# Patient Record
Sex: MALE | Race: WHITE | NOT HISPANIC OR LATINO | Employment: FULL TIME | URBAN - METROPOLITAN AREA
[De-identification: names, ages, dates, MRNs, and addresses within clinical notes are randomized per-mention and may not be internally consistent; named-entity substitution may affect disease eponyms.]

---

## 2017-11-10 ENCOUNTER — TELEPHONE (OUTPATIENT)
Dept: FAMILY MEDICINE CLINIC | Facility: CLINIC | Age: 22
End: 2017-11-10

## 2017-11-14 ENCOUNTER — TELEPHONE (OUTPATIENT)
Dept: FAMILY MEDICINE CLINIC | Facility: CLINIC | Age: 22
End: 2017-11-14

## 2017-11-14 NOTE — TELEPHONE ENCOUNTER
per telephone request, new prescriptions for test strips and pin needles were sent on November 10, 2017.

## 2017-11-29 ENCOUNTER — TELEPHONE (OUTPATIENT)
Dept: FAMILY MEDICINE CLINIC | Facility: CLINIC | Age: 22
End: 2017-11-29

## 2017-11-29 NOTE — TELEPHONE ENCOUNTER
First attemp to call pharmacy with no answer.  ----- Message from Sandra Trejo MA sent at 11/28/2017  1:57 PM EST -----  Regarding: FW: clarification on rx      ----- Message -----     From: Frances Isaac     Sent: 11/28/2017  10:33 AM       To: Sandra Trejo MA  Subject: clarification on rx                              [facility]--[phone] is calling regarding insulin detemir (LEVEMIR FLEXPEN) 100 UNIT/ML injection 40 units once a day --needs clarification

## 2017-12-04 ENCOUNTER — TELEPHONE (OUTPATIENT)
Dept: FAMILY MEDICINE CLINIC | Facility: CLINIC | Age: 22
End: 2017-12-04

## 2017-12-16 ENCOUNTER — OFFICE VISIT (OUTPATIENT)
Dept: FAMILY MEDICINE CLINIC | Facility: CLINIC | Age: 22
End: 2017-12-16

## 2017-12-16 VITALS
TEMPERATURE: 99 F | WEIGHT: 198 LBS | HEART RATE: 76 BPM | DIASTOLIC BLOOD PRESSURE: 90 MMHG | RESPIRATION RATE: 16 BRPM | BODY MASS INDEX: 26.12 KG/M2 | SYSTOLIC BLOOD PRESSURE: 130 MMHG

## 2017-12-16 DIAGNOSIS — L73.9 FOLLICULITIS: Primary | ICD-10-CM

## 2017-12-16 PROCEDURE — 99214 OFFICE O/P EST MOD 30 MIN: CPT | Performed by: FAMILY MEDICINE

## 2017-12-16 RX ORDER — CLINDAMYCIN HYDROCHLORIDE 300 MG/1
300 CAPSULE ORAL 3 TIMES DAILY
Qty: 30 CAPSULE | Refills: 0 | Status: SHIPPED | OUTPATIENT
Start: 2017-12-16 | End: 2019-05-03

## 2017-12-16 NOTE — PROGRESS NOTES
"Melina Lofton is a 22 y.o. male.     History of Present Illness   Saturday walk in clinic.  He is concerned with an excoriated rash to his chest wall over the past several months.  Over the past few weeks he has identified small yellow bumps.  He denies fever, chills or diffuse rashes.  He describes a chronic lifestyle of routine IV heroine and polysubstance abuse.  He reports that his injection sites will often \"get infected.\"  He denies upper extremity streaking, edema or erythema.  He denies palm or sole lesions.  He reports that he normally lives in [place] and \"just came up to [place] yesterday.\"  He describes adequate blood sugar control.    Review of Systems   Constitutional: Negative for chills and fever.   HENT: Negative for nosebleeds.    Respiratory: Negative for shortness of breath.    Cardiovascular: Negative for chest pain and leg swelling.   Gastrointestinal: Negative for diarrhea, nausea and vomiting.   Endocrine: Negative for polydipsia, polyphagia and polyuria.   Skin: Positive for rash.   Neurological: Negative for seizures and syncope.   Hematological: Negative for adenopathy. Does not bruise/bleed easily.       Objective    Sandra ARGUETA CMA in room assisting  Physical Exam   Constitutional: He is oriented to person, place, and time. He appears well-developed and well-nourished.   HENT:   Head: Normocephalic and atraumatic.   Right Ear: Hearing normal.   Left Ear: Hearing normal.   Mouth/Throat: Mucous membranes are normal.   Eyes: Conjunctivae and EOM are normal. Pupils are equal, round, and reactive to light.   Neck: Neck supple. No JVD present. No thyromegaly present.   Cardiovascular: Normal rate, regular rhythm and normal heart sounds.    Pulmonary/Chest: Effort normal and breath sounds normal.   Musculoskeletal: Normal range of motion.   Neurological: He is alert and oriented to person, place, and time. He has normal strength. No sensory deficit. Gait normal.   Skin: Skin is warm " and dry.   Chest wall folliculitis/impetigo see image   Psychiatric: He has a normal mood and affect. His behavior is normal. Judgment and thought content normal. Cognition and memory are normal.   Nursing note and vitals reviewed.             Assessment/Plan   Diagnoses and all orders for this visit:    Folliculitis  -     clindamycin (CLEOCIN) 300 MG capsule; Take 1 capsule by mouth 3 (Three) Times a Day.  - We reviewed his sulfa allergy  - Topical care reviewed  - RTC next week for follow up with his regularly treating physician  - Greater than 25 minutes with the patient with 50% of the time spent in counseling on lifestyle choices, IVDA and seeking help for addiction at the [facility].  He will go to the ED with any sudden changes or concerns.

## 2018-04-13 ENCOUNTER — OFFICE VISIT (OUTPATIENT)
Dept: FAMILY MEDICINE CLINIC | Facility: CLINIC | Age: 23
End: 2018-04-13

## 2018-04-13 VITALS
HEIGHT: 73 IN | SYSTOLIC BLOOD PRESSURE: 140 MMHG | HEART RATE: 97 BPM | OXYGEN SATURATION: 98 % | WEIGHT: 239 LBS | DIASTOLIC BLOOD PRESSURE: 78 MMHG | TEMPERATURE: 98.5 F | BODY MASS INDEX: 31.68 KG/M2

## 2018-04-13 DIAGNOSIS — E10.8 TYPE 1 DIABETES MELLITUS WITH COMPLICATION (HCC): Primary | ICD-10-CM

## 2018-04-13 DIAGNOSIS — G47.00 INSOMNIA, UNSPECIFIED TYPE: ICD-10-CM

## 2018-04-13 LAB — HBA1C MFR BLD: 8.5 %

## 2018-04-13 PROCEDURE — 99213 OFFICE O/P EST LOW 20 MIN: CPT | Performed by: FAMILY MEDICINE

## 2018-04-13 PROCEDURE — 83036 HEMOGLOBIN GLYCOSYLATED A1C: CPT | Performed by: FAMILY MEDICINE

## 2018-04-13 RX ORDER — MIRTAZAPINE 30 MG/1
30 TABLET, FILM COATED ORAL NIGHTLY
Qty: 90 TABLET | Refills: 3 | Status: SHIPPED | OUTPATIENT
Start: 2018-04-13 | End: 2019-05-03

## 2018-04-13 NOTE — PROGRESS NOTES
"Melina Lofton is a 22 y.o. male.     Diabetes   He presents for his follow-up diabetic visit. He has type 1 diabetes mellitus. There are no hypoglycemic associated symptoms. There are no diabetic associated symptoms. Pertinent negatives for diabetes include no chest pain. There are no hypoglycemic complications. Risk factors for coronary artery disease include diabetes mellitus. Current diabetic treatment includes insulin injections (Levemir and Novolog.). He is following a generally healthy diet. He participates in exercise intermittently. His home blood glucose trend is increasing steadily. An ACE inhibitor/angiotensin II receptor blocker is not being taken. He does not see a podiatrist.Eye exam is not current.        The following portions of the patient's history were reviewed and updated as appropriate: allergies, current medications, past social history and problem list.    Review of Systems   Eyes: Negative for visual disturbance.   Respiratory: Negative for shortness of breath.    Cardiovascular: Negative for chest pain.   Neurological: Negative for numbness.       Objective   /78 (BP Location: Left arm, Patient Position: Sitting, Cuff Size: Adult)   Pulse 97   Temp 98.5 °F (36.9 °C) (Oral)   Ht 185.4 cm (73\")   Wt 108 kg (239 lb)   SpO2 98%   BMI 31.53 kg/m²   Physical Exam   Constitutional: He appears well-developed and well-nourished.   Cardiovascular: Normal rate and regular rhythm.    Pulmonary/Chest: Effort normal and breath sounds normal.   Nursing note and vitals reviewed.      Assessment/Plan   Problem List Items Addressed This Visit        Endocrine    Type 1 diabetes mellitus - Primary    Relevant Orders    POC Glycosylated Hemoglobin (Hb A1C) (Completed)      Other Visit Diagnoses     Insomnia, unspecified type            Before meals now lives in [facility].  He's been there about a year and a half.  He is engaged in rehabilitation for drug use.  He says he is used " no drugs for several months now.  Does not have a primary care physician or an endocrinologist in [place].  He tells me he stopped smoking about 2 months ago and weak and graduated him on that.  He is been back in the gym now for about a month.  He needs refills on his medications and needs a portable way to obtain them.  He is had no episodes of hypoglycemia.  No difficulty with his feet nor with his vision.    Micro albumin is normal 10/50.      Drink plenty fluids.  Work on diet and weight loss.  Cut back on carbohydrates.    Continue medications as doing.    Refill Levemir flex pen 50 units daily #15+3.  Rx for NovoLog Flex pen 10 units 3 times a day #9+3.  Rx for Mirtazapine 30 mg each night #90+3.    Establish a primary care physician as well as an endocrinologist in the near future in [place].  He understands there is a good endocrinologist in [facility] and we recommended that person.  She knew with his diet and exercise program.  Work on losing weight.  His microalbumin level today was normal.  Hemoglobin A1c today is 8.5%.            Scribed for Dr Zion Perkins by Milla Mc [facility].          I, Zion Perkins MD, personally performed the services described in this documentation, as scribed by Milla Mc in my presence, and is both accurate and complete.

## 2018-09-12 DIAGNOSIS — E10.9 TYPE 1 DIABETES MELLITUS WITHOUT COMPLICATION (HCC): Primary | ICD-10-CM

## 2018-10-05 ENCOUNTER — TELEPHONE (OUTPATIENT)
Dept: FAMILY MEDICINE CLINIC | Facility: CLINIC | Age: 23
End: 2018-10-05

## 2018-10-05 NOTE — TELEPHONE ENCOUNTER
Spoke to pharmacy has been ordered----- Message from Kelli Childs sent at 10/1/2018 11:22 AM EDT -----  Regarding: ONE TOUCH LANCETS  Contact: [phone]  THE PHARMACY DOESN'T KNOW SPECIFIC TYPE AND NEEDS CLARIFICATION: ONE TOUCH DELICA INSTEAD OF LANCETS    ORDER [id number] [facility]  [phone] [facility]      PRESCRIBED ON  9/12/2018

## 2018-10-05 NOTE — TELEPHONE ENCOUNTER
----- Message from Kelli Childs sent at 10/1/2018 11:22 AM EDT -----  Regarding: ONE TOUCH LANCETS  Contact: [phone]  THE PHARMACY DOESN'T KNOW SPECIFIC TYPE AND NEEDS CLARIFICATION: ONE TOUCH DELICA INSTEAD OF LANCETS    ORDER [facility][id number] [facility]  [phone] [facility]      PRESCRIBED ON  9/12/2018

## 2018-10-05 NOTE — TELEPHONE ENCOUNTER
LEFT MESSAGE TO FIND OUT WHAT SIZE HIS LANCETS HE USES.----- Message from Kelli Childs sent at 10/1/2018 11:22 AM EDT -----  Regarding: ONE TOUCH LANCETS  Contact: [phone]  THE PHARMACY DOESN'T KNOW SPECIFIC TYPE AND NEEDS CLARIFICATION: ONE TOUCH DELICA INSTEAD OF LANCETS    ORDER #[id number] [facility]  [phone] [facility]      PRESCRIBED ON  9/12/2018

## 2019-05-03 ENCOUNTER — OFFICE VISIT (OUTPATIENT)
Dept: FAMILY MEDICINE CLINIC | Facility: CLINIC | Age: 24
End: 2019-05-03

## 2019-05-03 VITALS
SYSTOLIC BLOOD PRESSURE: 130 MMHG | OXYGEN SATURATION: 97 % | WEIGHT: 231 LBS | TEMPERATURE: 98.6 F | RESPIRATION RATE: 19 BRPM | BODY MASS INDEX: 30.48 KG/M2 | DIASTOLIC BLOOD PRESSURE: 98 MMHG | HEART RATE: 75 BPM

## 2019-05-03 DIAGNOSIS — E10.9 TYPE 1 DIABETES MELLITUS ON INSULIN THERAPY (HCC): Primary | ICD-10-CM

## 2019-05-03 DIAGNOSIS — I10 ESSENTIAL HYPERTENSION: ICD-10-CM

## 2019-05-03 LAB
ALBUMIN SERPL-MCNC: 5 G/DL (ref 3.5–5.2)
ALBUMIN/GLOB SERPL: 1.6 G/DL
ALP SERPL-CCNC: 76 U/L (ref 39–117)
ALT SERPL W P-5'-P-CCNC: 244 U/L (ref 1–41)
ANION GAP SERPL CALCULATED.3IONS-SCNC: 13.8 MMOL/L
AST SERPL-CCNC: 96 U/L (ref 1–40)
BILIRUB SERPL-MCNC: 0.8 MG/DL (ref 0.2–1.2)
BUN BLD-MCNC: 10 MG/DL (ref 6–20)
BUN/CREAT SERPL: 10.6 (ref 7–25)
CALCIUM SPEC-SCNC: 10 MG/DL (ref 8.6–10.5)
CHLORIDE SERPL-SCNC: 100 MMOL/L (ref 98–107)
CHOLEST SERPL-MCNC: 130 MG/DL (ref 0–200)
CO2 SERPL-SCNC: 25.2 MMOL/L (ref 22–29)
CREAT BLD-MCNC: 0.94 MG/DL (ref 0.76–1.27)
GFR SERPL CREATININE-BSD FRML MDRD: 98 ML/MIN/1.73
GLOBULIN UR ELPH-MCNC: 3.1 GM/DL
GLUCOSE BLD-MCNC: 127 MG/DL (ref 65–99)
HBA1C MFR BLD: 6.6 %
HDLC SERPL-MCNC: 43 MG/DL (ref 40–60)
LDLC SERPL CALC-MCNC: 77 MG/DL (ref 0–100)
LDLC/HDLC SERPL: 1.8 {RATIO}
POTASSIUM BLD-SCNC: 3.9 MMOL/L (ref 3.5–5.2)
PROT SERPL-MCNC: 8.1 G/DL (ref 6–8.5)
SODIUM BLD-SCNC: 139 MMOL/L (ref 136–145)
TRIGL SERPL-MCNC: 49 MG/DL (ref 0–150)
VLDLC SERPL-MCNC: 9.8 MG/DL (ref 5–40)

## 2019-05-03 PROCEDURE — 80061 LIPID PANEL: CPT | Performed by: FAMILY MEDICINE

## 2019-05-03 PROCEDURE — 36415 COLL VENOUS BLD VENIPUNCTURE: CPT | Performed by: FAMILY MEDICINE

## 2019-05-03 PROCEDURE — 99214 OFFICE O/P EST MOD 30 MIN: CPT | Performed by: FAMILY MEDICINE

## 2019-05-03 PROCEDURE — 80053 COMPREHEN METABOLIC PANEL: CPT | Performed by: FAMILY MEDICINE

## 2019-05-03 PROCEDURE — 83036 HEMOGLOBIN GLYCOSYLATED A1C: CPT | Performed by: FAMILY MEDICINE

## 2019-05-03 RX ORDER — LISINOPRIL 10 MG/1
10 TABLET ORAL DAILY
Qty: 90 TABLET | Refills: 1 | Status: SHIPPED | OUTPATIENT
Start: 2019-05-03 | End: 2019-11-18 | Stop reason: SDUPTHER

## 2019-05-03 NOTE — PROGRESS NOTES
Subjective   Lukas Lofton is a 24 y.o. male seen today for Diabetes.     Diabetes   He presents for his follow-up diabetic visit. He has type 1 diabetes mellitus. There are no hypoglycemic associated symptoms. (2 episodes of low glucose and use glucose tablets and improved.) There are no diabetic associated symptoms. Pertinent negatives for diabetes include no chest pain. There are no hypoglycemic complications. There are no diabetic complications. Risk factors for coronary artery disease include diabetes mellitus and male sex. Current diabetic treatment includes insulin injections (Levemir and NovoLog.). He is compliant with treatment all of the time. His weight is decreasing steadily. He is following a generally healthy diet. He participates in exercise intermittently. His home blood glucose trend is decreasing steadily.      BP today is 130/98.      The following portions of the patient's history were reviewed and updated as appropriate: allergies, current medications, past social history and problem list.    Review of Systems   Respiratory: Negative for shortness of breath.    Cardiovascular: Negative for chest pain.       Objective   /98   Pulse 75   Temp 98.6 °F (37 °C)   Resp 19   Wt 105 kg (231 lb)   SpO2 97%   BMI 30.48 kg/m²   Physical Exam   Constitutional: He appears well-developed and well-nourished.   Cardiovascular: Normal rate and regular rhythm.   Pulmonary/Chest: Effort normal and breath sounds normal.    Lukas had a diabetic foot exam performed (Normal) today.   During the foot exam he had a monofilament test performed.  Nursing note and vitals reviewed.      Assessment/Plan   Problem List Items Addressed This Visit     None      Visit Diagnoses     Type 1 diabetes mellitus on insulin therapy (CMS/[facility])    -  Primary    Relevant Orders    POC Glycosylated Hemoglobin (Hb A1C) (Completed)    Essential hypertension          EVELYNE is in today for follow-up on his diabetes at his father's  request.  The patient lives in [place] and now is gainfully employed as an .  He seems to have turned a corner.  His last admitted use of heroin was over one year ago in [place].  He is used none since.  He is feeling well.  We do note that his blood pressure is elevated today.  He is never been on an ACE inhibitor so we will go ahead and start him on lisinopril 10 mg a day.  He is also never been on a statin.  We will go ahead and check his lipid panel and metabolic panel.  We will also check a microalbumin and report the results by letter.  He tells me he is scheduled to see an endocrinologist in [place] in about 2 weeks.        Drink plenty fluids.  Continue to work on diet and weight loss.  Cut back on carbohydrates such as breads, potatoes, pastas and desserts.  Eat more  Fruits, vegetables, and lean meats such a fish and chicken.    Add Lisinopril 10 mg daily #90+1.    Continue other medications as doing.    Check a CMP and Lipids. Report results by letter.    Follow up as needed.                  Scribed for Dr Zion Perkins by Milla Mc CMA.          I, Zion Perkins MD, personally performed the services described in this documentation, as scribed by Milla Mc in my presence, and is both accurate and complete.        (Please note that portions of this note were completed with a voice recognition program. Efforts were made to edit the dictations,but occasionally words are mis transcribed.)

## 2019-06-18 DIAGNOSIS — E10.8 TYPE 1 DIABETES MELLITUS WITH COMPLICATION (HCC): ICD-10-CM

## 2019-06-18 NOTE — TELEPHONE ENCOUNTER
Insurance is requiring a 90 day supply and would like for it to be called in today.  Thank you. Please send to [facility] mail order pharmacy.

## 2019-11-18 DIAGNOSIS — E10.8 TYPE 1 DIABETES MELLITUS WITH COMPLICATION (HCC): ICD-10-CM

## 2019-11-18 DIAGNOSIS — I10 ESSENTIAL HYPERTENSION: ICD-10-CM

## 2019-11-19 RX ORDER — INSULIN ASPART 100 [IU]/ML
INJECTION, SOLUTION INTRAVENOUS; SUBCUTANEOUS
Qty: 30 ML | Refills: 0 | Status: SHIPPED | OUTPATIENT
Start: 2019-11-19

## 2019-11-19 RX ORDER — LISINOPRIL 10 MG/1
TABLET ORAL
Qty: 90 TABLET | Refills: 0 | Status: SHIPPED | OUTPATIENT
Start: 2019-11-19

## 2020-02-06 DIAGNOSIS — I10 ESSENTIAL HYPERTENSION: ICD-10-CM

## 2020-02-06 RX ORDER — LISINOPRIL 10 MG/1
TABLET ORAL
Qty: 90 TABLET | Refills: 0 | OUTPATIENT
Start: 2020-02-06

## 2022-07-30 ENCOUNTER — TELEPHONE ENCOUNTER (OUTPATIENT)
Age: 27
End: 2022-07-30

## 2022-07-31 ENCOUNTER — TELEPHONE ENCOUNTER (OUTPATIENT)
Age: 27
End: 2022-07-31

## 2024-01-17 ENCOUNTER — OFFICE VISIT (OUTPATIENT)
Dept: ENDOCRINOLOGY | Facility: CLINIC | Age: 29
End: 2024-01-17
Payer: MEDICAID

## 2024-01-17 VITALS
SYSTOLIC BLOOD PRESSURE: 126 MMHG | DIASTOLIC BLOOD PRESSURE: 84 MMHG | OXYGEN SATURATION: 98 % | BODY MASS INDEX: 28.99 KG/M2 | HEIGHT: 72 IN | WEIGHT: 214 LBS | HEART RATE: 67 BPM

## 2024-01-17 DIAGNOSIS — E10.65 TYPE 1 DIABETES MELLITUS WITH HYPERGLYCEMIA: Primary | ICD-10-CM

## 2024-01-17 PROBLEM — Z46.81 INSULIN PUMP TITRATION: Chronic | Status: ACTIVE | Noted: 2024-01-17

## 2024-01-17 PROBLEM — Z46.81 INSULIN PUMP TITRATION: Status: ACTIVE | Noted: 2024-01-17

## 2024-01-17 LAB
ALBUMIN UR-MCNC: <1.2 MG/DL
CREAT UR-MCNC: 87.1 MG/DL
EXPIRATION DATE: ABNORMAL
EXPIRATION DATE: ABNORMAL
GLUCOSE BLDC GLUCOMTR-MCNC: 313 MG/DL (ref 70–130)
HBA1C MFR BLD: 7.7 % (ref 4.5–5.7)
Lab: ABNORMAL
Lab: ABNORMAL
MICROALBUMIN/CREAT UR: NORMAL MG/G{CREAT}

## 2024-01-17 PROCEDURE — 1160F RVW MEDS BY RX/DR IN RCRD: CPT | Performed by: PHYSICIAN ASSISTANT

## 2024-01-17 PROCEDURE — 83036 HEMOGLOBIN GLYCOSYLATED A1C: CPT | Performed by: PHYSICIAN ASSISTANT

## 2024-01-17 PROCEDURE — 1159F MED LIST DOCD IN RCRD: CPT | Performed by: PHYSICIAN ASSISTANT

## 2024-01-17 PROCEDURE — 82043 UR ALBUMIN QUANTITATIVE: CPT | Performed by: PHYSICIAN ASSISTANT

## 2024-01-17 PROCEDURE — 99204 OFFICE O/P NEW MOD 45 MIN: CPT | Performed by: PHYSICIAN ASSISTANT

## 2024-01-17 PROCEDURE — 82570 ASSAY OF URINE CREATININE: CPT | Performed by: PHYSICIAN ASSISTANT

## 2024-01-17 PROCEDURE — 3051F HG A1C>EQUAL 7.0%<8.0%: CPT | Performed by: PHYSICIAN ASSISTANT

## 2024-01-17 PROCEDURE — 95251 CONT GLUC MNTR ANALYSIS I&R: CPT | Performed by: PHYSICIAN ASSISTANT

## 2024-01-17 RX ORDER — INSULIN ASPART 100 [IU]/ML
INJECTION, SOLUTION INTRAVENOUS; SUBCUTANEOUS
Qty: 60 ML | Refills: 1 | Status: SHIPPED | OUTPATIENT
Start: 2024-01-17

## 2024-01-17 RX ORDER — PROCHLORPERAZINE 25 MG/1
SUPPOSITORY RECTAL
COMMUNITY
Start: 2024-01-01

## 2024-01-17 RX ORDER — SILDENAFIL 50 MG/1
50 TABLET, FILM COATED ORAL AS NEEDED
COMMUNITY

## 2024-01-17 RX ORDER — GLUCAGON 3 MG/1
3 POWDER NASAL AS NEEDED
Qty: 1 EACH | Refills: 11 | Status: SHIPPED | OUTPATIENT
Start: 2024-01-17

## 2024-01-17 RX ORDER — INSULIN PMP CART,AUT,G6/7,CNTR
EACH SUBCUTANEOUS
COMMUNITY
Start: 2023-09-26

## 2024-01-17 NOTE — PROGRESS NOTES
Chief Complaint  Establish care for Diabetes Mellitus.    MALATHI Lofton is a 28 y.o. male who is here today for evaluation of Diabetes Mellitus type 1. The initial diagnosis of diabetes was made at age 15.    Remote hx of substance use. Has been clean for 3 days.     Diabetic complications: none  Eye exam current (within one year): due    Current diabetic medications include:  Novolog via OmniPod 5    Statin: not indicated    Diabetic Monitoring  -   OmniPod 5/Dexcom downloaded and reviewed (1/4/2024 to 1/17/2024): 45% time in range, fasting blood sugar ranges from 100-120, frequent postprandial hyperglycemia in the 200s and 300s, some late boluses, rare hypoglycemia      The following portions of the patient's history were reviewed and updated by me as appropriate: allergies, current medications, past family history, past social history, past surgical history and problem list.    Past Medical History:   Diagnosis Date    Diabetes mellitus type I     Heroin addiction     IDDM (insulin dependent diabetes mellitus)     Intravenous drug abuse, continuous     Tobacco dependence        Medications    Current Outpatient Medications:     Continuous Blood Gluc Sensor (Dexcom G6 Sensor), Change sensor every 10 days., Disp: , Rfl:     Continuous Blood Gluc Transmit (Dexcom G6 Transmitter) misc, Change transmitter every 3 months., Disp: , Rfl:     Insulin Disposable Pump (Omnipod 5 G6 Pod, Gen 5,) misc, Change pod every 3 days, Disp: , Rfl:     NOVOLOG FLEXPEN 100 UNIT/ML solution pen-injector sc pen, INJECT 10 UNITS SUBCUTANEOUSLY INTO THE APPROPRIATE AREA AS DIRECTED 3 TIMES A DAY WITH MEALS, Disp: 30 mL, Rfl: 0    sildenafil (VIAGRA) 50 MG tablet, Take 1 tablet by mouth As Needed for Erectile Dysfunction., Disp: , Rfl:     Glucagon (Baqsimi Two Pack) 3 MG/DOSE powder, 3 mg into the nostril(s) as directed by provider As Needed (hypoglycemia). Place rx on file, Disp: 1 each, Rfl: 11    Insulin Aspart (novoLOG) 100 UNIT/ML  "injection, Use up to 60 u daily via omnipod 5 as directed, Disp: 60 mL, Rfl: 1    Review of Systems  Review of Systems   All other systems reviewed and are negative.       Physical Exam    /84   Pulse 67   Ht 182.9 cm (72\")   Wt 97.1 kg (214 lb)   SpO2 98%   BMI 29.02 kg/m² Body mass index is 29.02 kg/m².  Physical Exam  Constitutional:       General: He is not in acute distress.     Appearance: He is well-developed. He is not diaphoretic.   Neck:      Thyroid: No thyromegaly.      Vascular: No JVD.      Trachea: No tracheal deviation.   Cardiovascular:      Rate and Rhythm: Normal rate and regular rhythm.      Pulses:           Dorsalis pedis pulses are 2+ on the right side and 2+ on the left side.        Posterior tibial pulses are 2+ on the right side and 2+ on the left side.      Heart sounds: Normal heart sounds. No murmur heard.  Pulmonary:      Effort: Pulmonary effort is normal. No respiratory distress.      Breath sounds: Normal breath sounds. No wheezing.   Musculoskeletal:         General: No tenderness.      Cervical back: Neck supple.      Right foot: No deformity or Charcot foot.      Left foot: No deformity or Charcot foot.   Feet:      Right foot:      Protective Sensation: 5 sites tested.  5 sites sensed.      Skin integrity: No ulcer, blister, skin breakdown, erythema, warmth or callus.      Left foot:      Protective Sensation: 5 sites tested.  5 sites sensed.      Skin integrity: No ulcer, blister, skin breakdown, erythema, warmth or callus.      Comments: Diabetic Foot Exam Performed and Monofilament Test Performed      Skin:     General: Skin is warm and dry.      Findings: No erythema or rash.   Neurological:      Mental Status: He is alert and oriented to person, place, and time.   Psychiatric:         Behavior: Behavior normal.         Thought Content: Thought content normal.         Judgment: Judgment normal.         Labs and Imaging   Lab Results   Component Value Date    HGBA1C " 7.7 (A) 01/17/2024    HGBA1C 6.6 05/03/2019    HGBA1C 8.5 04/13/2018             Most Recent A1C          1/17/2024    10:13   HGBA1C Most Recent   Hemoglobin A1C 7.7      Labs from 9/1/2023 reviewed  ALT 1061, , alkaline phosphatase 180, GFR greater than 60, creatinine 0.5, hemoglobin 15, hematocrit 45.1, triglycerides 148, LDL 90, TSH 0.71      Assessment / Plan   Diagnoses and all orders for this visit:    1. Type 1 diabetes mellitus with hyperglycemia (Primary)  -     POC Glycosylated Hemoglobin (Hb A1C)  -     POC Glucose, Blood  -     Insulin Aspart (novoLOG) 100 UNIT/ML injection; Use up to 60 u daily via omnipod 5 as directed  Dispense: 60 mL; Refill: 1  -     Glucagon (Baqsimi Two Pack) 3 MG/DOSE powder; 3 mg into the nostril(s) as directed by provider As Needed (hypoglycemia). Place rx on file  Dispense: 1 each; Refill: 11  -     Microalbumin / Creatinine Urine Ratio - Urine, Clean Catch        Diabetes Mellitus 1 is under fair control.  -A1c 7.7  -45% time in range   -increase max basal rate to allow automated mode to be more efficient, change BG correction threshold from 190 to 160 though will likely lower further in the future  -encouraged patient to bolus sooner and that will decrease postprandial hyperglycemia  -has baqsimi and will check expiration date, new rx sent to place on file at the pharmacy  -encouraged yearly eye exam  -foot exam updated today  -check UACR, otherwise labs are utd    There are no Patient Instructions on file for this visit.    Follow up: Return in about 4 months (around 5/17/2024).      Signed by: Gi Ann PA-C  Endocrinology

## 2024-01-26 ENCOUNTER — PRIOR AUTHORIZATION (OUTPATIENT)
Dept: ENDOCRINOLOGY | Facility: CLINIC | Age: 29
End: 2024-01-26
Payer: MEDICAID

## 2024-05-20 ENCOUNTER — HOSPITAL ENCOUNTER (OUTPATIENT)
Dept: RADIOLOGY | Facility: CLINIC | Age: 29
Discharge: HOME | End: 2024-05-20
Payer: MEDICAID

## 2024-05-20 PROCEDURE — 73060 X-RAY EXAM OF HUMERUS: CPT | Performed by: NURSE PRACTITIONER

## 2024-05-21 ENCOUNTER — OFFICE VISIT (OUTPATIENT)
Dept: ENDOCRINOLOGY | Facility: CLINIC | Age: 29
End: 2024-05-21
Payer: MEDICAID

## 2024-05-21 ENCOUNTER — PATIENT ROUNDING (BHMG ONLY) (OUTPATIENT)
Dept: URGENT CARE | Facility: CLINIC | Age: 29
End: 2024-05-21
Payer: MEDICAID

## 2024-05-21 VITALS
BODY MASS INDEX: 30.24 KG/M2 | SYSTOLIC BLOOD PRESSURE: 118 MMHG | HEART RATE: 72 BPM | WEIGHT: 223 LBS | OXYGEN SATURATION: 99 % | DIASTOLIC BLOOD PRESSURE: 88 MMHG

## 2024-05-21 DIAGNOSIS — E10.65 TYPE 1 DIABETES MELLITUS WITH HYPERGLYCEMIA: Primary | Chronic | ICD-10-CM

## 2024-05-21 DIAGNOSIS — Z46.81 INSULIN PUMP TITRATION: Chronic | ICD-10-CM

## 2024-05-21 LAB
EXPIRATION DATE: ABNORMAL
EXPIRATION DATE: ABNORMAL
GLUCOSE BLDC GLUCOMTR-MCNC: 290 MG/DL (ref 70–130)
HBA1C MFR BLD: 7.6 % (ref 4.5–5.7)
Lab: ABNORMAL
Lab: ABNORMAL

## 2024-05-21 PROCEDURE — 83036 HEMOGLOBIN GLYCOSYLATED A1C: CPT | Performed by: PHYSICIAN ASSISTANT

## 2024-05-21 PROCEDURE — 3051F HG A1C>EQUAL 7.0%<8.0%: CPT | Performed by: PHYSICIAN ASSISTANT

## 2024-05-21 PROCEDURE — 1159F MED LIST DOCD IN RCRD: CPT | Performed by: PHYSICIAN ASSISTANT

## 2024-05-21 PROCEDURE — 99214 OFFICE O/P EST MOD 30 MIN: CPT | Performed by: PHYSICIAN ASSISTANT

## 2024-05-21 PROCEDURE — 1160F RVW MEDS BY RX/DR IN RCRD: CPT | Performed by: PHYSICIAN ASSISTANT

## 2024-05-21 PROCEDURE — 95251 CONT GLUC MNTR ANALYSIS I&R: CPT | Performed by: PHYSICIAN ASSISTANT

## 2024-05-21 RX ORDER — GLUCAGON 3 MG/1
3 POWDER NASAL AS NEEDED
Qty: 1 EACH | Refills: 11 | Status: SHIPPED | OUTPATIENT
Start: 2024-05-21

## 2024-05-21 RX ORDER — INSULIN ASPART 100 [IU]/ML
INJECTION, SOLUTION INTRAVENOUS; SUBCUTANEOUS
Qty: 60 ML | Refills: 1 | Status: SHIPPED | OUTPATIENT
Start: 2024-05-21

## 2024-05-21 NOTE — PROGRESS NOTES
Chief Complaint  F/u for Diabetes Mellitus.    HPI   Lukas Lofton is a 29 y.o. male who is here today for f/u of Diabetes Mellitus type 1. The initial diagnosis of diabetes was made at age 15.    Under a lot of stress.     Diabetic complications: none  Eye exam current (within one year): due    Current diabetic medications include:  Novolog via OmniPod 5    Statin: not indicated    Diabetic Monitoring  -   Dexcom reviewed (5/7/24-5/20/24): 45% time in range, fasting blood sugar ranges from 120-140, someone postprandial hyperglycemia in the 200s, rare hypoglycemia    Glooko down therefore unable to retrieve OmniPod data.     The following portions of the patient's history were reviewed and updated by me as appropriate: allergies, current medications, past family history, past social history, past surgical history and problem list.    Past Medical History:   Diagnosis Date    Diabetes mellitus type I     Heroin addiction     IDDM (insulin dependent diabetes mellitus)     Intravenous drug abuse, continuous     Tobacco dependence        Medications    Current Outpatient Medications:     Continuous Blood Gluc Sensor (Dexcom G6 Sensor), Change sensor every 10 days., Disp: , Rfl:     Continuous Blood Gluc Transmit (Dexcom G6 Transmitter) misc, Change transmitter every 3 months., Disp: , Rfl:     Glucagon (Baqsimi Two Pack) 3 MG/DOSE powder, 3 mg into the nostril(s) as directed by provider As Needed (hypoglycemia). Place rx on file, Disp: 1 each, Rfl: 11    Insulin Aspart (novoLOG) 100 UNIT/ML injection, Use up to 60 u daily via omnipod 5 as directed, Disp: 60 mL, Rfl: 1    Insulin Disposable Pump (Omnipod 5 G6 Pod, Gen 5,) misc, Change pod every 3 days, Disp: , Rfl:     sildenafil (VIAGRA) 50 MG tablet, Take 1 tablet by mouth As Needed for Erectile Dysfunction., Disp: , Rfl:     Review of Systems  Review of Systems   All other systems reviewed and are negative.       Physical Exam    /88   Pulse 72   Wt 101 kg (223  lb)   SpO2 99%   BMI 30.24 kg/m² Body mass index is 30.24 kg/m².  Physical Exam  Constitutional:       General: He is not in acute distress.     Appearance: He is well-developed. He is not diaphoretic.   Neck:      Thyroid: No thyromegaly.      Vascular: No JVD.      Trachea: No tracheal deviation.   Cardiovascular:      Rate and Rhythm: Normal rate and regular rhythm.   Pulmonary:      Effort: Pulmonary effort is normal.      Breath sounds: Normal breath sounds.   Musculoskeletal:         General: No tenderness.      Cervical back: Neck supple.   Skin:     General: Skin is warm and dry.      Findings: No erythema or rash.   Neurological:      Mental Status: He is alert and oriented to person, place, and time.   Psychiatric:         Behavior: Behavior normal.         Thought Content: Thought content normal.         Judgment: Judgment normal.         Labs and Imaging   Lab Results   Component Value Date    HGBA1C 7.6 (A) 05/21/2024    HGBA1C 7.7 (A) 01/17/2024    HGBA1C 6.6 05/03/2019             Most Recent A1C          5/21/2024    10:00   HGBA1C Most Recent   Hemoglobin A1C 7.6      Microalbumin / Creatinine Urine Ratio - Urine, Clean Catch (01/17/2024 10:35)     Labs from 9/1/2023 reviewed  ALT 1061, , alkaline phosphatase 180, GFR greater than 60, creatinine 0.5, hemoglobin 15, hematocrit 45.1, triglycerides 148, LDL 90, TSH 0.71  Microalbumin          1/17/2024    10:35   Microalbumin   Microalbumin, Urine <1.2        Assessment / Plan   Diagnoses and all orders for this visit:    1. Type 1 diabetes mellitus with hyperglycemia (Primary)  -     POC Glycosylated Hemoglobin (Hb A1C)  -     POC Glucose, Blood  -     Insulin Aspart (novoLOG) 100 UNIT/ML injection; Use up to 60 u daily via omnipod 5 as directed  Dispense: 60 mL; Refill: 1  -     Glucagon (Baqsimi Two Pack) 3 MG/DOSE powder; 3 mg into the nostril(s) as directed by provider As Needed (hypoglycemia). Place rx on file  Dispense: 1 each; Refill:  11    2. Insulin pump titration        Diabetes Mellitus 1 is under fair control.  -A1c 7.6  -45% time in range   -unable to retrieve omnipod data - glooko down  -would like fasting sugar lower - decrease target BG from 140 to 120, change BG correction threshold from 160 to 140  -BA for baqsimi was approved  -encouraged yearly eye exam  -foot exam updated 1/2023  -labs are utd    Insulin pump titration  -Decrease target blood sugar and blood sugar correction threshold    There are no Patient Instructions on file for this visit.    Follow up: Return in about 3 months (around 8/21/2024).      Signed by: Gi Ann PA-C  Endocrinology

## 2024-08-21 ENCOUNTER — OFFICE VISIT (OUTPATIENT)
Dept: ENDOCRINOLOGY | Facility: CLINIC | Age: 29
End: 2024-08-21
Payer: MEDICAID

## 2024-08-21 VITALS
OXYGEN SATURATION: 98 % | HEART RATE: 76 BPM | HEIGHT: 72 IN | WEIGHT: 210.8 LBS | SYSTOLIC BLOOD PRESSURE: 122 MMHG | DIASTOLIC BLOOD PRESSURE: 84 MMHG | BODY MASS INDEX: 28.55 KG/M2

## 2024-08-21 DIAGNOSIS — E10.65 TYPE 1 DIABETES MELLITUS WITH HYPERGLYCEMIA: Primary | Chronic | ICD-10-CM

## 2024-08-21 DIAGNOSIS — Z46.81 INSULIN PUMP TITRATION: Chronic | ICD-10-CM

## 2024-08-21 LAB
ALBUMIN SERPL-MCNC: 4.4 G/DL (ref 3.5–5.2)
ALBUMIN/GLOB SERPL: 1.4 G/DL
ALP SERPL-CCNC: 159 U/L (ref 39–117)
ALT SERPL W P-5'-P-CCNC: 284 U/L (ref 1–41)
ANION GAP SERPL CALCULATED.3IONS-SCNC: 11 MMOL/L (ref 5–15)
AST SERPL-CCNC: 185 U/L (ref 1–40)
BILIRUB SERPL-MCNC: 0.7 MG/DL (ref 0–1.2)
BUN SERPL-MCNC: 15 MG/DL (ref 6–20)
BUN/CREAT SERPL: 17 (ref 7–25)
CALCIUM SPEC-SCNC: 9.8 MG/DL (ref 8.6–10.5)
CHLORIDE SERPL-SCNC: 100 MMOL/L (ref 98–107)
CHOLEST SERPL-MCNC: 101 MG/DL (ref 0–200)
CO2 SERPL-SCNC: 27 MMOL/L (ref 22–29)
CREAT SERPL-MCNC: 0.88 MG/DL (ref 0.76–1.27)
EGFRCR SERPLBLD CKD-EPI 2021: 118.6 ML/MIN/1.73
EXPIRATION DATE: ABNORMAL
EXPIRATION DATE: ABNORMAL
GLOBULIN UR ELPH-MCNC: 3.1 GM/DL
GLUCOSE BLDC GLUCOMTR-MCNC: 216 MG/DL (ref 70–130)
GLUCOSE SERPL-MCNC: 220 MG/DL (ref 65–99)
HBA1C MFR BLD: 7.6 % (ref 4.5–5.7)
HDLC SERPL-MCNC: 39 MG/DL (ref 40–60)
LDLC SERPL CALC-MCNC: 45 MG/DL (ref 0–100)
LDLC/HDLC SERPL: 1.16 {RATIO}
Lab: ABNORMAL
Lab: ABNORMAL
POTASSIUM SERPL-SCNC: 4.8 MMOL/L (ref 3.5–5.2)
PROT SERPL-MCNC: 7.5 G/DL (ref 6–8.5)
SODIUM SERPL-SCNC: 138 MMOL/L (ref 136–145)
TRIGL SERPL-MCNC: 83 MG/DL (ref 0–150)
TSH SERPL DL<=0.05 MIU/L-ACNC: 1.29 UIU/ML (ref 0.27–4.2)
VLDLC SERPL-MCNC: 17 MG/DL (ref 5–40)

## 2024-08-21 PROCEDURE — 84443 ASSAY THYROID STIM HORMONE: CPT | Performed by: PHYSICIAN ASSISTANT

## 2024-08-21 PROCEDURE — 80061 LIPID PANEL: CPT | Performed by: PHYSICIAN ASSISTANT

## 2024-08-21 PROCEDURE — 80053 COMPREHEN METABOLIC PANEL: CPT | Performed by: PHYSICIAN ASSISTANT

## 2024-08-21 RX ORDER — PROCHLORPERAZINE 25 MG/1
SUPPOSITORY RECTAL
Qty: 9 EACH | Refills: 3 | Status: SHIPPED | OUTPATIENT
Start: 2024-08-21

## 2024-08-21 RX ORDER — INSULIN PMP CART,AUT,G6/7,CNTR
1 EACH SUBCUTANEOUS
Qty: 30 EACH | Refills: 3 | Status: SHIPPED | OUTPATIENT
Start: 2024-08-21

## 2024-08-21 RX ORDER — INSULIN ASPART 100 [IU]/ML
INJECTION, SOLUTION INTRAVENOUS; SUBCUTANEOUS
Qty: 60 ML | Refills: 1 | Status: SHIPPED | OUTPATIENT
Start: 2024-08-21

## 2024-08-21 RX ORDER — GLUCAGON 3 MG/1
3 POWDER NASAL AS NEEDED
Qty: 1 EACH | Refills: 11 | Status: SHIPPED | OUTPATIENT
Start: 2024-08-21

## 2024-08-21 NOTE — PROGRESS NOTES
Chief Complaint  F/u for Diabetes Mellitus.    MALATHI Lofton is a 29 y.o. male who is here today for f/u of Diabetes Mellitus type 1. The initial diagnosis of diabetes was made at age 15.    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     Diabetic complications: none  Eye exam current (within one year): due    Current diabetic medications include:  Novolog via OmniPod 5    Statin: not indicated    Diabetic Monitoring  -   Dexcom reviewed (8/8/24-8/21/24): 35% time in range, 90% basal, 10% bolus, fasting blood sugar ranges from 120-140, postprandial hyperglycemia - frequent missed boluses    The following portions of the patient's history were reviewed and updated by me as appropriate: allergies, current medications, past family history, past social history, past surgical history and problem list.    Past Medical History:   Diagnosis Date    Diabetes mellitus type I     Heroin addiction     IDDM (insulin dependent diabetes mellitus)     Intravenous drug abuse, continuous     Tobacco dependence        Medications    Current Outpatient Medications:     Continuous Blood Gluc Transmit (Dexcom G6 Transmitter) misc, Change transmitter every 3 months., Disp: , Rfl:     Continuous Glucose Sensor (Dexcom G6 Sensor), Apply  to cheek Every 10 (Ten) Days. Change sensor every 10 days., Disp: 9 each, Rfl: 3    Glucagon (Baqsimi Two Pack) 3 MG/DOSE powder, 3 mg into the nostril(s) as directed by provider As Needed (hypoglycemia). Place rx on file, Disp: 1 each, Rfl: 11    Insulin Aspart (novoLOG) 100 UNIT/ML injection, Use up to 60 u daily via omnipod 5 as directed, Disp: 60 mL, Rfl: 1    Insulin Disposable Pump (Omnipod 5 G6 Pods, Gen 5,) misc, Inject 1 each under the skin into the appropriate area as directed Every 14 (Fourteen) Days. Change pod every 3 days, Disp: 30 each, Rfl: 3    sildenafil (VIAGRA) 50 MG tablet, Take 1 tablet by mouth As Needed for Erectile Dysfunction., Disp: , Rfl:     Review of Systems  Review of Systems   All other systems  "reviewed and are negative.       Physical Exam    /84   Pulse 76   Ht 182.9 cm (72.01\")   Wt 95.6 kg (210 lb 12.8 oz)   SpO2 98%   BMI 28.58 kg/m² Body mass index is 28.58 kg/m².  Physical Exam  Constitutional:       General: He is not in acute distress.     Appearance: He is well-developed. He is not diaphoretic.   Neck:      Thyroid: No thyromegaly.      Vascular: No JVD.      Trachea: No tracheal deviation.   Cardiovascular:      Rate and Rhythm: Normal rate and regular rhythm.   Pulmonary:      Effort: Pulmonary effort is normal.      Breath sounds: Normal breath sounds.   Musculoskeletal:         General: No tenderness.      Cervical back: Neck supple.   Skin:     General: Skin is warm and dry.      Findings: No erythema or rash.   Neurological:      Mental Status: He is alert and oriented to person, place, and time.   Psychiatric:         Behavior: Behavior normal.         Thought Content: Thought content normal.         Judgment: Judgment normal.         Labs and Imaging   Lab Results   Component Value Date    HGBA1C 7.6 (A) 08/21/2024    HGBA1C 7.6 (A) 05/21/2024    HGBA1C 7.7 (A) 01/17/2024             Most Recent A1C          8/21/2024    10:17   HGBA1C Most Recent   Hemoglobin A1C 7.6      Microalbumin / Creatinine Urine Ratio - Urine, Clean Catch (01/17/2024 10:35)     Microalbumin          1/17/2024    10:35   Microalbumin   Microalbumin, Urine <1.2        Assessment / Plan   Diagnoses and all orders for this visit:    1. Type 1 diabetes mellitus with hyperglycemia (Primary)  -     POC Glucose, Blood  -     POC Glycosylated Hemoglobin (Hb A1C)  -     Continuous Glucose Sensor (Dexcom G6 Sensor); Apply  to cheek Every 10 (Ten) Days. Change sensor every 10 days.  Dispense: 9 each; Refill: 3  -     Glucagon (Baqsimi Two Pack) 3 MG/DOSE powder; 3 mg into the nostril(s) as directed by provider As Needed (hypoglycemia). Place rx on file  Dispense: 1 each; Refill: 11  -     Insulin Aspart (novoLOG) " 100 UNIT/ML injection; Use up to 60 u daily via omnipod 5 as directed  Dispense: 60 mL; Refill: 1  -     Insulin Disposable Pump (Omnipod 5 G6 Pods, Gen 5,) misc; Inject 1 each under the skin into the appropriate area as directed Every 14 (Fourteen) Days. Change pod every 3 days  Dispense: 30 each; Refill: 3  -     Comprehensive Metabolic Panel  -     Lipid Panel  -     TSH    2. Insulin pump titration        Diabetes Mellitus 1 is under poor control.  -A1c 7.6  -35% time in range   -updated basal rate to reflect automated mode  -encouraged bolusing before eating to decrease postprandial hyperglycemia  -retinopathy scan today  -foot exam updated 1/2027  -labs today, UACR due 1/2025    Insulin pump titration  -update basal rate to reflect automated mode    There are no Patient Instructions on file for this visit.    Follow up: Return in about 4 months (around 12/21/2024).      Signed by: Gi Ann PA-C  Endocrinology

## 2024-10-21 DIAGNOSIS — E10.65 TYPE 1 DIABETES MELLITUS WITH HYPERGLYCEMIA: Chronic | ICD-10-CM

## 2024-10-21 RX ORDER — INSULIN ASPART 100 [IU]/ML
INJECTION, SOLUTION INTRAVENOUS; SUBCUTANEOUS
Qty: 60 ML | Refills: 3 | Status: SHIPPED | OUTPATIENT
Start: 2024-10-21

## 2024-10-21 NOTE — TELEPHONE ENCOUNTER
Rx Refill Note  Requested Prescriptions     Pending Prescriptions Disp Refills    Insulin Aspart (novoLOG) 100 UNIT/ML injection [Pharmacy Med Name: INSULIN ASPART 100/ML INJ,10ML] 60 mL 1     Sig: USE UP TO 60 UNITS VIA OMNIPOD 5 DAILY AS DIRECTED      Last office visit with prescribing clinician: 8/21/2024      Next office visit with prescribing clinician: 1/10/2025                  Vanita Mahajan MA  10/21/24, 14:32 EDT

## 2025-01-10 ENCOUNTER — OFFICE VISIT (OUTPATIENT)
Dept: ENDOCRINOLOGY | Facility: CLINIC | Age: 30
End: 2025-01-10
Payer: MEDICAID

## 2025-01-10 VITALS
DIASTOLIC BLOOD PRESSURE: 78 MMHG | HEART RATE: 77 BPM | SYSTOLIC BLOOD PRESSURE: 120 MMHG | WEIGHT: 222 LBS | BODY MASS INDEX: 30.07 KG/M2 | HEIGHT: 72 IN

## 2025-01-10 DIAGNOSIS — E10.65 TYPE 1 DIABETES MELLITUS WITH HYPERGLYCEMIA: Primary | Chronic | ICD-10-CM

## 2025-01-10 DIAGNOSIS — Z46.81 INSULIN PUMP TITRATION: Chronic | ICD-10-CM

## 2025-01-10 DIAGNOSIS — Z86.19 HISTORY OF HEPATITIS C: ICD-10-CM

## 2025-01-10 DIAGNOSIS — R74.8 ELEVATED LIVER ENZYMES: ICD-10-CM

## 2025-01-10 LAB
EXPIRATION DATE: ABNORMAL
EXPIRATION DATE: ABNORMAL
GLUCOSE BLDC GLUCOMTR-MCNC: 146 MG/DL (ref 70–130)
HBA1C MFR BLD: 7.3 % (ref 4.5–5.7)
Lab: ABNORMAL
Lab: ABNORMAL

## 2025-01-10 PROCEDURE — 80053 COMPREHEN METABOLIC PANEL: CPT | Performed by: PHYSICIAN ASSISTANT

## 2025-01-10 PROCEDURE — 82570 ASSAY OF URINE CREATININE: CPT | Performed by: PHYSICIAN ASSISTANT

## 2025-01-10 PROCEDURE — 82043 UR ALBUMIN QUANTITATIVE: CPT | Performed by: PHYSICIAN ASSISTANT

## 2025-01-10 NOTE — PROGRESS NOTES
Chief Complaint  F/u for Diabetes Mellitus.    HPI   Lukas Lofton is a 29 y.o. male who is here today for f/u of Diabetes Mellitus type 1. The initial diagnosis of diabetes was made at age 15.    Liver enzymes were elevated last visit. He reports today he has hep c. He was seen by GI in [place] but missed an appt.     Now with sole custody of his kids. Mother of children passed away recently.     Diabetic complications: none  Eye exam current (within one year): due    Current diabetic medications include:  Novolog via OmniPod 5    Statin: not indicated    Diabetic Monitoring  -   OmniPod 5/Dexcom reviewed (1/4/25-1/10/25): 54% time in range, 64% basal, 36% bolus, fasting blood sugar ~120, postprandial hyperglycemia - some due to missed boluses other times despite bolusing    The following portions of the patient's history were reviewed and updated by me as appropriate: allergies, current medications, past family history, past social history, past surgical history and problem list.      Past Medical History:   Diagnosis Date    Diabetes mellitus type I     Heroin addiction     IDDM (insulin dependent diabetes mellitus)     Intravenous drug abuse, continuous     Tobacco dependence        Medications    Current Outpatient Medications:     Continuous Blood Gluc Transmit (Dexcom G6 Transmitter) misc, Change transmitter every 3 months., Disp: , Rfl:     Continuous Glucose Sensor (Dexcom G6 Sensor), Apply  to cheek Every 10 (Ten) Days. Change sensor every 10 days., Disp: 9 each, Rfl: 3    Glucagon (Baqsimi Two Pack) 3 MG/DOSE powder, 3 mg into the nostril(s) as directed by provider As Needed (hypoglycemia). Place rx on file, Disp: 1 each, Rfl: 11    Insulin Aspart (novoLOG) 100 UNIT/ML injection, USE UP TO 60 UNITS VIA OMNIPOD 5 DAILY AS DIRECTED, Disp: 60 mL, Rfl: 3    Insulin Disposable Pump (Omnipod 5 G6 Pods, Gen 5,) misc, Inject 1 each under the skin into the appropriate area as directed Every 14 (Fourteen) Days.  "Change pod every 3 days, Disp: 30 each, Rfl: 3    sildenafil (VIAGRA) 50 MG tablet, Take 1 tablet by mouth As Needed for Erectile Dysfunction., Disp: , Rfl:     Review of Systems  Review of Systems   All other systems reviewed and are negative.       Physical Exam    /78   Pulse 77   Ht 182.9 cm (72.01\")   Wt 101 kg (222 lb)   BMI 30.10 kg/m² Body mass index is 30.1 kg/m².  Physical Exam  Constitutional:       General: He is not in acute distress.     Appearance: He is well-developed. He is not diaphoretic.   Neck:      Thyroid: No thyromegaly.      Vascular: No JVD.      Trachea: No tracheal deviation.   Cardiovascular:      Rate and Rhythm: Normal rate and regular rhythm.      Pulses:           Dorsalis pedis pulses are 2+ on the right side and 2+ on the left side.        Posterior tibial pulses are 2+ on the right side and 2+ on the left side.   Pulmonary:      Effort: Pulmonary effort is normal.      Breath sounds: Normal breath sounds.   Musculoskeletal:         General: No tenderness.      Cervical back: Neck supple.      Right foot: No deformity or Charcot foot.      Left foot: No deformity or Charcot foot.   Feet:      Right foot:      Protective Sensation: 5 sites tested.  5 sites sensed.      Skin integrity: Callus present. No ulcer, blister, skin breakdown, erythema or warmth.      Left foot:      Protective Sensation: 5 sites tested.  5 sites sensed.      Skin integrity: Callus present. No ulcer, blister, skin breakdown, erythema or warmth.      Comments: Diabetic Foot Exam Performed and Monofilament Test Performed  Decrease sensation is toes bilaterally      Skin:     General: Skin is warm and dry.      Findings: No erythema or rash.   Neurological:      Mental Status: He is alert and oriented to person, place, and time.   Psychiatric:         Behavior: Behavior normal.         Thought Content: Thought content normal.         Judgment: Judgment normal.         Labs and Imaging   Lab Results "   Component Value Date    HGBA1C 7.3 (A) 01/10/2025    HGBA1C 7.6 (A) 08/21/2024    HGBA1C 7.6 (A) 05/21/2024     CMP          8/21/2024    10:39   CMP   Glucose 220    BUN 15    Creatinine 0.88    EGFR 118.6    Sodium 138    Potassium 4.8    Chloride 100    Calcium 9.8    Total Protein 7.5    Albumin 4.4    Globulin 3.1    Total Bilirubin 0.7    Alkaline Phosphatase 159    AST (SGOT) 185    ALT (SGPT) 284    Albumin/Globulin Ratio 1.4    BUN/Creatinine Ratio 17.0    Anion Gap 11.0        Lipid Panel          8/21/2024    10:39   Lipid Panel   Total Cholesterol 101    Triglycerides 83    HDL Cholesterol 39    VLDL Cholesterol 17    LDL Cholesterol  45    LDL/HDL Ratio 1.16      TSH          8/21/2024    10:39   TSH   TSH 1.290      Most Recent A1C          1/10/2025    11:12   HGBA1C Most Recent   Hemoglobin A1C 7.3        Microalbumin          1/17/2024    10:35   Microalbumin   Microalbumin, Urine <1.2        Assessment / Plan   Diagnoses and all orders for this visit:    1. Type 1 diabetes mellitus with hyperglycemia (Primary)  -     POC Glucose, Blood  -     POC Glycosylated Hemoglobin (Hb A1C)  -     Comprehensive Metabolic Panel  -     Microalbumin / Creatinine Urine Ratio - Urine, Clean Catch    2. Elevated liver enzymes  -     Comprehensive Metabolic Panel  -     Ambulatory Referral to Gastroenterology    3. History of hepatitis C  -     Ambulatory Referral to Gastroenterology    4. Insulin pump titration          Diabetes Mellitus type 1  -control is improving  -A1c 7.3, down from 7.6  -51% time in range, up from 35% last visit  -postprandial hyperglycemia despite bolusing - strengthen carb ratio   -retinopathy scan 8/2024 without evidence of retinopathy  -foot exam updated today  -labs today, UACR due 1/2025    Insulin pump titration  -Strengthen carb ratio    Elevated liver enzymes  -Repeat liver enzymes today  -History of hep C  -Referral to GI    There are no Patient Instructions on file for this  visit.    Follow up: Return in about 3 months (around 4/10/2025).      Signed by: Gi Ann PA-C  Endocrinology

## 2025-01-11 LAB
ALBUMIN SERPL-MCNC: 4.1 G/DL (ref 3.5–5.2)
ALBUMIN UR-MCNC: <1.2 MG/DL
ALBUMIN/GLOB SERPL: 1.2 G/DL
ALP SERPL-CCNC: 107 U/L (ref 39–117)
ALT SERPL W P-5'-P-CCNC: 308 U/L (ref 1–41)
ANION GAP SERPL CALCULATED.3IONS-SCNC: 11.1 MMOL/L (ref 5–15)
AST SERPL-CCNC: 185 U/L (ref 1–40)
BILIRUB SERPL-MCNC: 1.1 MG/DL (ref 0–1.2)
BUN SERPL-MCNC: 10 MG/DL (ref 6–20)
BUN/CREAT SERPL: 12.3 (ref 7–25)
CALCIUM SPEC-SCNC: 9.1 MG/DL (ref 8.6–10.5)
CHLORIDE SERPL-SCNC: 99 MMOL/L (ref 98–107)
CO2 SERPL-SCNC: 24.9 MMOL/L (ref 22–29)
CREAT SERPL-MCNC: 0.81 MG/DL (ref 0.76–1.27)
CREAT UR-MCNC: 150.8 MG/DL
EGFRCR SERPLBLD CKD-EPI 2021: 121.6 ML/MIN/1.73
GLOBULIN UR ELPH-MCNC: 3.5 GM/DL
GLUCOSE SERPL-MCNC: 107 MG/DL (ref 65–99)
MICROALBUMIN/CREAT UR: NORMAL MG/G{CREAT}
POTASSIUM SERPL-SCNC: 3.9 MMOL/L (ref 3.5–5.2)
PROT SERPL-MCNC: 7.6 G/DL (ref 6–8.5)
SODIUM SERPL-SCNC: 135 MMOL/L (ref 136–145)

## 2025-04-16 ENCOUNTER — OFFICE VISIT (OUTPATIENT)
Dept: ENDOCRINOLOGY | Facility: CLINIC | Age: 30
End: 2025-04-16
Payer: MEDICAID

## 2025-04-16 VITALS
DIASTOLIC BLOOD PRESSURE: 75 MMHG | BODY MASS INDEX: 32.1 KG/M2 | OXYGEN SATURATION: 99 % | WEIGHT: 237 LBS | HEART RATE: 86 BPM | HEIGHT: 72 IN | SYSTOLIC BLOOD PRESSURE: 118 MMHG

## 2025-04-16 DIAGNOSIS — E10.65 TYPE 1 DIABETES MELLITUS WITH HYPERGLYCEMIA: Primary | ICD-10-CM

## 2025-04-16 LAB
EXPIRATION DATE: ABNORMAL
EXPIRATION DATE: ABNORMAL
GLUCOSE BLDC GLUCOMTR-MCNC: 381 MG/DL (ref 70–130)
HBA1C MFR BLD: 7.8 % (ref 4.5–5.7)
Lab: ABNORMAL
Lab: ABNORMAL

## 2025-04-16 NOTE — PROGRESS NOTES
Office Note      Date: 2025  Patient Name: Lukas Lofton  MRN: [id number]  : 1995    Chief Complaint   Patient presents with    Diabetes       History of Present Illness:   Lukas Lofton is a 30 y.o. male who presents for Diabetes type 1.   Diagnosed: age 15  Current RX: Novolog via Omnipod    Bg checks are done:continuously with Dexcom  Hypoglycemia :has had some     Patient's wife passed away in November and he has 2 small children, Has been eating more snacks with his kids    Has not been upgraded to G7 because he wants to continue using the [employer] luisa    Last A1c:  Hemoglobin A1C   Date Value Ref Range Status   2025 7.8 (A) 4.5 - 5.7 % Final     The last 2 weeks of CGM data are reviewed.  1 % are low  46 % are in range  38 % are 180-250  15 % are >250  GMI: 7.8  The glycemic pattern shows: Glucose averages are in the high end of target range averaging around 180-200 in a fairly steady line      Changes in health since last visit: none.     DM Health Maintenance:  Ophtho: 2024  Monofilament / Foot exam: 1/10/25  Lipids/Statin: not taking a statin with last FLP showing LDL 45 24  ANKUSH: 1/10/25  TSH: 1.290 24  Aspirin: not indicated  ACE/ARB: no     Diabetic Complications:  Eyes: No  Kidneys: No  Feet: No  Heart: No    Subjective          Review of Systems:   Review of Systems   Constitutional:  Negative for activity change, appetite change and fatigue.   Respiratory:  Negative for chest tightness and shortness of breath.    Gastrointestinal:  Negative for abdominal pain.   Musculoskeletal:  Negative for myalgias.   Neurological:  Negative for numbness.   Psychiatric/Behavioral:  The patient is not nervous/anxious.        The following portions of the patient's history were reviewed and updated as appropriate: allergies, current medications, past family history, past medical history, past social history, past surgical history, and problem list.    Objective     Visit Vitals  /75  "  Pulse 86   Ht 182.9 cm (72\")   Wt 108 kg (237 lb)   SpO2 99%   BMI 32.14 kg/m²           Physical Exam:  Physical Exam  Constitutional:       Appearance: He is well-developed.   HENT:      Head: Normocephalic and atraumatic.      Right Ear: External ear normal.      Left Ear: External ear normal.   Eyes:      Conjunctiva/sclera: Conjunctivae normal.   Pulmonary:      Effort: Pulmonary effort is normal.   Musculoskeletal:         General: Normal range of motion.      Cervical back: Normal range of motion.   Skin:     General: Skin is warm and dry.   Psychiatric:         Behavior: Behavior normal.          Assessment / Plan      Assessment & Plan:  Diagnoses and all orders for this visit:    1. Type 1 diabetes mellitus with hyperglycemia (Primary)  Assessment & Plan:  Diabetes is worsening.   Continue current treatment regimen.  Recommended an ADA diet.  Regular aerobic exercise.    Patient is going to get back on track with healthy lifestyle. Has rejoined the gym and plans to start getting more regular exercise.     Reviewed pump data. Patient will work on entering carb counts before meals. When he does put in accurate carb counts, pump responds appropriately. Strengthened sensitivity, lowered from 40 to 35.    Foot exam, eye exam, urine microalbumin/creatine ratio, fasting labs up to date.    Diabetes will be reassessed in 3 months    Orders:  -     POC Glucose, Blood  -     POC Glycosylated Hemoglobin (Hb A1C)  -     Glucagon 1 MG/0.2ML solution auto-injector; Inject 1 mg under the skin into the appropriate area as directed As Needed (hypoglycemia).  Dispense: 0.2 mL; Refill: 5        Return in about 3 months (around 7/16/2025) for Follow up.    Portions of this note were completed with voice recognition program.  Electronically signed by Lesli Gilbert PA-C  [facility] [place]  04/16/2025  "

## 2025-04-21 NOTE — ASSESSMENT & PLAN NOTE
Diabetes is worsening.   Continue current treatment regimen.  Recommended an ADA diet.  Regular aerobic exercise.    Patient is going to get back on track with healthy lifestyle. Has rejoined the gym and plans to start getting more regular exercise.     Reviewed pump data. Patient will work on entering carb counts before meals. When he does put in accurate carb counts, pump responds appropriately. Strengthened sensitivity, lowered from 40 to 35.    Foot exam, eye exam, urine microalbumin/creatine ratio, fasting labs up to date.    Diabetes will be reassessed in 3 months

## 2025-05-27 DIAGNOSIS — E10.65 TYPE 1 DIABETES MELLITUS WITH HYPERGLYCEMIA: Chronic | ICD-10-CM

## 2025-05-27 RX ORDER — PROCHLORPERAZINE 25 MG/1
1 SUPPOSITORY RECTAL
Qty: 1 EACH | Refills: 3 | Status: SHIPPED | OUTPATIENT
Start: 2025-05-27

## 2025-05-27 RX ORDER — PROCHLORPERAZINE 25 MG/1
SUPPOSITORY RECTAL
Qty: 9 EACH | Refills: 3 | Status: SHIPPED | OUTPATIENT
Start: 2025-05-27

## 2025-05-27 RX ORDER — INSULIN PMP CART,AUT,G6/7,CNTR
1 EACH SUBCUTANEOUS EVERY OTHER DAY
Qty: 45 EACH | Refills: 3 | Status: SHIPPED | OUTPATIENT
Start: 2025-05-27

## 2025-06-10 RX ORDER — ACYCLOVIR 400 MG/1
1 TABLET ORAL
Qty: 9 EACH | Refills: 1 | Status: SHIPPED | OUTPATIENT
Start: 2025-06-10

## 2025-06-10 NOTE — TELEPHONE ENCOUNTER
Rx Refill Note  Requested Prescriptions      No prescriptions requested or ordered in this encounter      Last office visit with prescribing clinician: 4/16/2025     Next office visit with prescribing clinician: 8/8/2025

## 2025-07-17 ENCOUNTER — TELEPHONE (OUTPATIENT)
Dept: ENDOCRINOLOGY | Facility: CLINIC | Age: 30
End: 2025-07-17
Payer: MEDICAID

## 2025-07-17 NOTE — TELEPHONE ENCOUNTER
CALLED PT TO GET RESCHEDULED SINCE FRANDY IS OUT ON 08/08. LEFT VM TO CALL BACK. OFFERING THE 29TH OF AUGUST.

## 2025-07-18 NOTE — TELEPHONE ENCOUNTER
SECOND ATTEMPT TO CONTACT PT. LEFT VM TO CALL BACK.     CANCELLING APPT AND SENDING MYCHART MESSAGE FOR PT TO CALL BACK.